# Patient Record
Sex: FEMALE | Race: ASIAN | NOT HISPANIC OR LATINO | URBAN - METROPOLITAN AREA
[De-identification: names, ages, dates, MRNs, and addresses within clinical notes are randomized per-mention and may not be internally consistent; named-entity substitution may affect disease eponyms.]

---

## 2019-05-06 VITALS
RESPIRATION RATE: 16 BRPM | OXYGEN SATURATION: 100 % | WEIGHT: 130.07 LBS | TEMPERATURE: 98 F | HEART RATE: 58 BPM | SYSTOLIC BLOOD PRESSURE: 134 MMHG | HEIGHT: 60 IN | DIASTOLIC BLOOD PRESSURE: 79 MMHG

## 2019-05-06 NOTE — H&P ADULT - NSICDXPASTMEDICALHX_GEN_ALL_CORE_FT
PAST MEDICAL HISTORY:  CAD (coronary artery disease)     Diabetes     History of hypertension     Hypercholesterolemia

## 2019-05-06 NOTE — H&P ADULT - HISTORY OF PRESENT ILLNESS
57yo F with PMHx of HTN, HLD, DM, PAD and known CAD who presented to [facility] on 3/12/19 c/o CP x 1 week. The pain is exertional, occurring with walking and worst when climbing stairs. Pt denies ..... palpitations, diaphoresis, dizziness, syncope, SOB, MERCADO, orthopnea, PND, LE edema, fatigue, N/V, melena, hematochezia, fever or chills. During her admission, she had a CTA coronaries (3/12/19) revealing Calcium score 1687 (99th%), LAD diffuse extensive calcified plaque in mid-distal 50-75%, LCx diffuse extensive calcified plaque in prox, mid, distal 50-75%, RCA diffuse extensive calcified plaque in mid-distal 50-75% 57yo F with PMHx of HTN, HLD, DM and known CAD/NSTEMI s/p PCI @ [facility] 3/13/19 (PTCA/ENDER OM2, PTCA OM1) who presented to [facility] on 3/12/19 c/o CP x 1 week. The pain is exertional, occurring with walking and worst when climbing stairs. Pt denies ..... palpitations, diaphoresis, dizziness, syncope, SOB, MERCADO, orthopnea, PND, LE edema, fatigue, N/V, melena, hematochezia, fever or chills. During her admission she ruled in NSTEMI and had a CTA coronaries (3/12/19) revealing Calcium score 1687 (99th%), LAD diffuse extensive calcified plaque in mid-distal 50-75%, LCx diffuse extensive calcified plaque in prox, mid, distal 50-75%, RCA diffuse extensive calcified plaque in mid-distal 50-75%. She subsequently had a Cardiac Cath (3/13/19) receiving PTCA OM1 (95%), PTCA/ENDER OM2 (80%), LM luminal, pLAD 70%, mLAD 75%, pLCx 40%, mRCA 40%, dRCA 80%, RPDA 75%, EF 60% 59yo F with PMHx of HTN, HLD, DM and known CAD/NSTEMI s/p PCI @ [facility] 3/13/19 (PTCA/ENDER OM2, PTCA OM1) who originally presented to [facility] on 3/12/19 c/o exertional CP x 1 week when walking or climbing stairs. Since her last procedure, she endorses "mild palpitations" and MERCADO when going up 3 flight of stairs to get to her apt, which resolves with rest. Pt denies CP, diaphoresis, dizziness, syncope, SOB at rest, orthopnea, PND, LE edema, fatigue, N/V, melena, hematochezia, fever or chills. During her admission she ruled in NSTEMI and had a CTA coronaries (3/12/19) revealing Calcium score 1687 (99th%), LAD diffuse extensive calcified plaque in mid-distal 50-75%, LCx diffuse extensive calcified plaque in prox, mid, distal 50-75%, RCA diffuse extensive calcified plaque in mid-distal 50-75%. She subsequently had a Cardiac Cath (3/13/19) receiving PTCA OM1 (95%), PTCA/ENDER OM2 (80%), LM luminal, pLAD 70%, mLAD 75%, pLCx 40%, mRCA 40%, dRCA 80%, RPDA 75%, EF 60%.    In light of pts risk factors, CCS class III anginal symptoms and known residual disease, pt now referred to [facility] for staged PCI with CSI.

## 2019-05-06 NOTE — H&P ADULT - ASSESSMENT
57yo F with PMHx of HTN, HLD, DM and known CAD/NSTEMI s/p PCI @ [facility] 3/13/19 (PTCA/ENDER OM2, PTCA OM1) who originally presented to [facility] on 3/12/19 c/o exertional CP x 1 week when walking or climbing stairs.  In light of pts risk factors, CCS class III anginal symptoms and known residual disease, pt now referred to [facility] for staged PCI with CSI.    -H/H stable, no signs of active bleeding. Compliant with aspirin 81 mg daily and brilinta 90 mg BID???  -EF 60%, euvolemic on exam?, pre cath fluids with NS @     Risks & benefits of procedure and alternative therapy have been explained to the patient including but not limited to: allergic reaction, bleeding w/possible need for blood transfusion, infection, renal and vascular compromise, limb damage, arrhythmia, stroke, vessel dissection/perforation, Myocardial infarction, emergent CABG. Informed consent obtained and in chart. 57yo F with PMHx of HTN, HLD, DM and known CAD/NSTEMI s/p PCI @ [facility] 3/13/19 (PTCA/ENDER OM2, PTCA OM1) who originally presented to [facility] on 3/12/19 c/o exertional CP x 1 week when walking or climbing stairs.  In light of pts risk factors, CCS class III anginal symptoms and known residual disease, pt now referred to [facility] for staged PCI with CSI.    -H/H stable, no signs of active bleeding. Compliant with aspirin 81 mg daily and brilinta 90 mg BID and took home doses this am. No additional given pre-cath   -EF 60%, euvolemic on exam, pre cath fluids with NS @ 75 cc/hr     Risks & benefits of procedure and alternative therapy have been explained to the patient including but not limited to: allergic reaction, bleeding w/possible need for blood transfusion, infection, renal and vascular compromise, limb damage, arrhythmia, stroke, vessel dissection/perforation, Myocardial infarction, emergent CABG. Informed consent obtained and in chart.

## 2019-05-07 ENCOUNTER — INPATIENT (INPATIENT)
Facility: HOSPITAL | Age: 59
LOS: 0 days | Discharge: HOME | DRG: 247 | End: 2019-05-08
Attending: INTERNAL MEDICINE | Admitting: INTERNAL MEDICINE
Payer: COMMERCIAL

## 2019-05-07 LAB
ALBUMIN SERPL ELPH-MCNC: 4.4 G/DL — SIGNIFICANT CHANGE UP (ref 3.3–5)
ALP SERPL-CCNC: 63 U/L — SIGNIFICANT CHANGE UP (ref 40–120)
ALT FLD-CCNC: 23 U/L — SIGNIFICANT CHANGE UP (ref 10–45)
ANION GAP SERPL CALC-SCNC: 12 MMOL/L — SIGNIFICANT CHANGE UP (ref 5–17)
APTT BLD: 29.6 SEC — SIGNIFICANT CHANGE UP (ref 27.5–36.3)
AST SERPL-CCNC: 23 U/L — SIGNIFICANT CHANGE UP (ref 10–40)
BASOPHILS # BLD AUTO: 0.06 K/UL — SIGNIFICANT CHANGE UP (ref 0–0.2)
BASOPHILS NFR BLD AUTO: 1 % — SIGNIFICANT CHANGE UP (ref 0–2)
BILIRUB SERPL-MCNC: 0.8 MG/DL — SIGNIFICANT CHANGE UP (ref 0.2–1.2)
BUN SERPL-MCNC: 11 MG/DL — SIGNIFICANT CHANGE UP (ref 7–23)
CALCIUM SERPL-MCNC: 10.2 MG/DL — SIGNIFICANT CHANGE UP (ref 8.4–10.5)
CHLORIDE SERPL-SCNC: 105 MMOL/L — SIGNIFICANT CHANGE UP (ref 96–108)
CHOLEST SERPL-MCNC: 123 MG/DL — SIGNIFICANT CHANGE UP (ref 10–199)
CK MB CFR SERPL CALC: 1.1 NG/ML — SIGNIFICANT CHANGE UP (ref 0–6.7)
CK SERPL-CCNC: 93 U/L — SIGNIFICANT CHANGE UP (ref 25–170)
CO2 SERPL-SCNC: 23 MMOL/L — SIGNIFICANT CHANGE UP (ref 22–31)
CREAT SERPL-MCNC: 0.64 MG/DL — SIGNIFICANT CHANGE UP (ref 0.5–1.3)
EOSINOPHIL # BLD AUTO: 0.39 K/UL — SIGNIFICANT CHANGE UP (ref 0–0.5)
EOSINOPHIL NFR BLD AUTO: 6.7 % — HIGH (ref 0–6)
GLUCOSE BLDC GLUCOMTR-MCNC: 161 MG/DL — HIGH (ref 70–99)
GLUCOSE SERPL-MCNC: 176 MG/DL — HIGH (ref 70–99)
HBA1C BLD-MCNC: 8.7 % — HIGH (ref 4–5.6)
HCT VFR BLD CALC: 37.2 % — SIGNIFICANT CHANGE UP (ref 34.5–45)
HDLC SERPL-MCNC: 43 MG/DL — LOW
HGB BLD-MCNC: 12.2 G/DL — SIGNIFICANT CHANGE UP (ref 11.5–15.5)
IMM GRANULOCYTES NFR BLD AUTO: 0.3 % — SIGNIFICANT CHANGE UP (ref 0–1.5)
INR BLD: 0.93 — SIGNIFICANT CHANGE UP (ref 0.88–1.16)
LIPID PNL WITH DIRECT LDL SERPL: 57 MG/DL — SIGNIFICANT CHANGE UP
LYMPHOCYTES # BLD AUTO: 1.62 K/UL — SIGNIFICANT CHANGE UP (ref 1–3.3)
LYMPHOCYTES # BLD AUTO: 27.7 % — SIGNIFICANT CHANGE UP (ref 13–44)
MCHC RBC-ENTMCNC: 28.8 PG — SIGNIFICANT CHANGE UP (ref 27–34)
MCHC RBC-ENTMCNC: 32.8 GM/DL — SIGNIFICANT CHANGE UP (ref 32–36)
MCV RBC AUTO: 87.7 FL — SIGNIFICANT CHANGE UP (ref 80–100)
MONOCYTES # BLD AUTO: 0.36 K/UL — SIGNIFICANT CHANGE UP (ref 0–0.9)
MONOCYTES NFR BLD AUTO: 6.2 % — SIGNIFICANT CHANGE UP (ref 2–14)
NEUTROPHILS # BLD AUTO: 3.4 K/UL — SIGNIFICANT CHANGE UP (ref 1.8–7.4)
NEUTROPHILS NFR BLD AUTO: 58.1 % — SIGNIFICANT CHANGE UP (ref 43–77)
NRBC # BLD: 0 /100 WBCS — SIGNIFICANT CHANGE UP (ref 0–0)
PLATELET # BLD AUTO: 262 K/UL — SIGNIFICANT CHANGE UP (ref 150–400)
POTASSIUM SERPL-MCNC: 5 MMOL/L — SIGNIFICANT CHANGE UP (ref 3.5–5.3)
POTASSIUM SERPL-SCNC: 5 MMOL/L — SIGNIFICANT CHANGE UP (ref 3.5–5.3)
PROT SERPL-MCNC: 8.3 G/DL — SIGNIFICANT CHANGE UP (ref 6–8.3)
PROTHROM AB SERPL-ACNC: 10.5 SEC — SIGNIFICANT CHANGE UP (ref 10–12.9)
RBC # BLD: 4.24 M/UL — SIGNIFICANT CHANGE UP (ref 3.8–5.2)
RBC # FLD: 12.6 % — SIGNIFICANT CHANGE UP (ref 10.3–14.5)
SODIUM SERPL-SCNC: 140 MMOL/L — SIGNIFICANT CHANGE UP (ref 135–145)
TOTAL CHOLESTEROL/HDL RATIO MEASUREMENT: 2.9 RATIO — LOW (ref 3.3–7.1)
TRIGL SERPL-MCNC: 115 MG/DL — SIGNIFICANT CHANGE UP (ref 10–149)
WBC # BLD: 5.85 K/UL — SIGNIFICANT CHANGE UP (ref 3.8–10.5)
WBC # FLD AUTO: 5.85 K/UL — SIGNIFICANT CHANGE UP (ref 3.8–10.5)

## 2019-05-07 PROCEDURE — 93571 IV DOP VEL&/PRESS C FLO 1ST: CPT | Mod: 26

## 2019-05-07 PROCEDURE — 92924 PRQ TRLUML C ATHRC 1 ART&/BR: CPT | Mod: LD

## 2019-05-07 PROCEDURE — 93010 ELECTROCARDIOGRAM REPORT: CPT | Mod: 59

## 2019-05-07 PROCEDURE — 92978 ENDOLUMINL IVUS OCT C 1ST: CPT | Mod: 26

## 2019-05-07 PROCEDURE — 93454 CORONARY ARTERY ANGIO S&I: CPT | Mod: 26,XU

## 2019-05-07 PROCEDURE — 92979 ENDOLUMINL IVUS OCT C EA: CPT | Mod: 26

## 2019-05-07 PROCEDURE — 99221 1ST HOSP IP/OBS SF/LOW 40: CPT

## 2019-05-07 RX ORDER — INSULIN LISPRO 100/ML
VIAL (ML) SUBCUTANEOUS ONCE
Qty: 0 | Refills: 0 | Status: DISCONTINUED | OUTPATIENT
Start: 2019-05-07 | End: 2019-05-07

## 2019-05-07 RX ORDER — SODIUM CHLORIDE 9 MG/ML
1000 INJECTION, SOLUTION INTRAVENOUS
Qty: 0 | Refills: 0 | Status: DISCONTINUED | OUTPATIENT
Start: 2019-05-07 | End: 2019-05-07

## 2019-05-07 RX ORDER — CARVEDILOL PHOSPHATE 80 MG/1
3.12 CAPSULE, EXTENDED RELEASE ORAL EVERY 12 HOURS
Qty: 0 | Refills: 0 | Status: DISCONTINUED | OUTPATIENT
Start: 2019-05-07 | End: 2019-05-08

## 2019-05-07 RX ORDER — DEXTROSE 50 % IN WATER 50 %
25 SYRINGE (ML) INTRAVENOUS ONCE
Qty: 0 | Refills: 0 | Status: DISCONTINUED | OUTPATIENT
Start: 2019-05-07 | End: 2019-05-07

## 2019-05-07 RX ORDER — LOSARTAN POTASSIUM 100 MG/1
25 TABLET, FILM COATED ORAL DAILY
Qty: 0 | Refills: 0 | Status: DISCONTINUED | OUTPATIENT
Start: 2019-05-08 | End: 2019-05-08

## 2019-05-07 RX ORDER — ACETAMINOPHEN 500 MG
650 TABLET ORAL ONCE
Qty: 0 | Refills: 0 | Status: COMPLETED | OUTPATIENT
Start: 2019-05-07 | End: 2019-05-07

## 2019-05-07 RX ORDER — ATORVASTATIN CALCIUM 80 MG/1
40 TABLET, FILM COATED ORAL AT BEDTIME
Qty: 0 | Refills: 0 | Status: DISCONTINUED | OUTPATIENT
Start: 2019-05-07 | End: 2019-05-08

## 2019-05-07 RX ORDER — CHLORHEXIDINE GLUCONATE 213 G/1000ML
1 SOLUTION TOPICAL ONCE
Qty: 0 | Refills: 0 | Status: DISCONTINUED | OUTPATIENT
Start: 2019-05-07 | End: 2019-05-07

## 2019-05-07 RX ORDER — SODIUM CHLORIDE 9 MG/ML
500 INJECTION INTRAMUSCULAR; INTRAVENOUS; SUBCUTANEOUS
Qty: 0 | Refills: 0 | Status: DISCONTINUED | OUTPATIENT
Start: 2019-05-07 | End: 2019-05-07

## 2019-05-07 RX ORDER — DEXTROSE 50 % IN WATER 50 %
12.5 SYRINGE (ML) INTRAVENOUS ONCE
Qty: 0 | Refills: 0 | Status: DISCONTINUED | OUTPATIENT
Start: 2019-05-07 | End: 2019-05-07

## 2019-05-07 RX ORDER — TICAGRELOR 90 MG/1
90 TABLET ORAL
Qty: 0 | Refills: 0 | Status: DISCONTINUED | OUTPATIENT
Start: 2019-05-07 | End: 2019-05-08

## 2019-05-07 RX ORDER — SIMETHICONE 80 MG/1
80 TABLET, CHEWABLE ORAL ONCE
Qty: 0 | Refills: 0 | Status: COMPLETED | OUTPATIENT
Start: 2019-05-07 | End: 2019-05-07

## 2019-05-07 RX ORDER — ASPIRIN/CALCIUM CARB/MAGNESIUM 324 MG
81 TABLET ORAL DAILY
Qty: 0 | Refills: 0 | Status: DISCONTINUED | OUTPATIENT
Start: 2019-05-07 | End: 2019-05-08

## 2019-05-07 RX ORDER — GLUCAGON INJECTION, SOLUTION 0.5 MG/.1ML
1 INJECTION, SOLUTION SUBCUTANEOUS ONCE
Qty: 0 | Refills: 0 | Status: DISCONTINUED | OUTPATIENT
Start: 2019-05-07 | End: 2019-05-07

## 2019-05-07 RX ORDER — DEXTROSE 50 % IN WATER 50 %
15 SYRINGE (ML) INTRAVENOUS ONCE
Qty: 0 | Refills: 0 | Status: DISCONTINUED | OUTPATIENT
Start: 2019-05-07 | End: 2019-05-07

## 2019-05-07 RX ADMIN — Medication 650 MILLIGRAM(S): at 18:26

## 2019-05-07 RX ADMIN — SIMETHICONE 80 MILLIGRAM(S): 80 TABLET, CHEWABLE ORAL at 23:30

## 2019-05-07 RX ADMIN — CARVEDILOL PHOSPHATE 3.12 MILLIGRAM(S): 80 CAPSULE, EXTENDED RELEASE ORAL at 21:45

## 2019-05-07 RX ADMIN — TICAGRELOR 90 MILLIGRAM(S): 90 TABLET ORAL at 21:45

## 2019-05-07 RX ADMIN — ATORVASTATIN CALCIUM 40 MILLIGRAM(S): 80 TABLET, FILM COATED ORAL at 21:45

## 2019-05-07 NOTE — CONSULT NOTE ADULT - SUBJECTIVE AND OBJECTIVE BOX
Preventive Cardiology Consultation Note    Cardiologist - Dr. Snow     CHIEF COMPLAINT: s/p cardiac catheterization requiring cardiovascular prevention optimization and education    HISTORY OF PRESENT ILLNESS: 59yo F with PMHx of HTN, HLD, DM and known CAD/NSTEMI s/p PCI @ [facility] 3/13/19 (PTCA/ENDER OM2, PTCA OM1). During her admission she ruled in NSTEMI and had a CTA coronaries (3/12/19) revealing Calcium score 1687 (99th%), LAD diffuse extensive calcified plaque in mid-distal 50-75%, LCx diffuse extensive calcified plaque in prox, mid, distal 50-75%, RCA diffuse extensive calcified plaque in mid-distal 50-75%. She subsequently had a Cardiac Cath (3/13/19) receiving PTCA OM1 (95%), PTCA/ENDER OM2 (80%), LM luminal, pLAD 70%, mLAD 75%, pLCx 40%, mRCA 40%, dRCA 80%, RPDA 75%, EF 60%. Patient is now s/p staged PCI on 5/7/19: ENDER x 2 p/m LAD. Patent OM1 stent. mRCA 50%. EF 60%.     Review of systems otherwise negative.     Lifestyle History:  Mediterranean Diet Score (9 question survey) was 5.   (8-9: optimal, 6-7: near-optimal, 4-5: suboptimal, 0-3: markedly suboptimal)  Exercise: Patient denies any regular exercise other than walking necessary for daily activities.   Smoking: Patient denies any history of smoking.   Stress: Patient denies any stress.     PAST MEDICAL & SURGICAL HISTORY:  CAD (coronary artery disease)  Hypercholesterolemia  History of hypertension  Diabetes  No significant past surgical history    FAMILY HISTORY:   Patient denies any first degree family history of premature CAD or CVA.     Allergies:   clotrimazole (Rash)  Honey (Other)      HOME MEDICATIONS:  · 	Aspirin Enteric Coated 81 mg oral delayed release tablet: 1 tab(s) orally once a day, Last Dose Taken:    · 	Brilinta (ticagrelor) 90 mg oral tablet: 1 tab(s) orally 2 times a day, Last Dose Taken:    · 	atorvastatin 40 mg oral tablet: 1 tab(s) orally once a day, Last Dose Taken:    · 	losartan 25 mg oral tablet: 1 tab(s) orally once a day  · 	carvedilol 3.125 mg oral tablet: 1 tab(s) orally 2 times a day, Last Dose Taken:      PHYSICAL EXAM:  · Temp (F)	98.3 Degrees F	  · Temp (C)	36.8 Degrees C	  · Heart Rate	58 /min	  · Respiration Rate (breaths/min)	16 /min	  · BP Systolic	134 mm Hg	  · BP Diastolic	79 mm Hg	  · Blood Pressure - Method	electronic	  · BP Noninvasive Mean	97 mm Hg	  · SpO2 (%)	100 %	  · O2 delivery	room air 	    Height (cm): 152.4 (05-07 @ 11:27)  Weight (kg): 59 (05-07 @ 11:27)  BMI (kg/m2): 25.4 (05-07 @ 11:27)  	  Gen- awake, conversive  Head-NCAT; eyes: no corneal arcus noted b/l; no xanthelasmas   Neck- no thyromegaly, no cervical LAD, no JVD, no carotid bruit b/l  Respiratory- good air entry b/l, no WRR  Cardiovascular- S1S2, RRR, no MRG appr, no LE edema b/l, distal pulses 1+ b/l  Gastrointestinal- no HSM, no masses  Neurology- moves all extremities, no focal deficits  Psych- normal affect, non-depressed mood  Skin- no lesions, no rashes, no xanthomas     LABS:	                        12.2   5.85  )-----------( 262      ( 07 May 2019 11:07 )             37.2     05-07    140  |  105  |  11  ----------------------------<  176<H>  5.0   |  23  |  0.64    Ca    10.2      07 May 2019 11:07    TPro  8.3  /  Alb  4.4  /  TBili  0.8  /  DBili  x   /  AST  23  /  ALT  23  /  AlkPhos  63  05-07      Hemoglobin A1C, Whole Blood: 8.7 % (05-07 @ 11:07)      Cholesterol, Serum: 123 mg/dL (05-07 @ 11:07)  HDL Cholesterol, Serum: 43 mg/dL (05-07 @ 11:07)  Triglycerides, Serum: 115 mg/dL (05-07 @ 11:07)  Direct LDL: 57 mg/dL (05-07 @ 11:07)      ASSESSMENT/RECOMMENDATIONS: 	  Patient's dietary, exercise and overall lifestyle habits were reviewed. The concept of atherosclerosis and its systemic nature was discussed with a focus on the need to get all cardiovascular risk factors to goal. At this time, I would like to make the following recommendations to optimize atherosclerotic risk factors.     RECOMMENDATIONS:   Anti-platelet Therapy: DAPT per interventionalist recommendation.   Lipid Therapy: Patient is currently taking atorvastatin 40mg daily and is compliant and tolerating it well. We believe this is an appropriate medication for her at this time. Her current lipid profile is at goal, and lifestyle modifications will likely benefit her further. If necessary in the future, it would be reasonable to either increase the statin dosage and/or add Zetia 10mg daily to further optimize her medication regimen and keep her LDL level <70.   Hypertension: Blood pressures during this stay were well-controlled.   Mediterranean Diet Score is 5. Some suggestions include continue incorporating 2 or more servings per day of vegetables, fruits, and whole grains. Increase intake of fish and legumes/beans to 2 or more servings per week. Aim to increase intake of healthy fats, such as olive oil and avocados, and have a handful of nuts/seeds most days. Reduce red/processed meat consumption to 2 or fewer times per week.   Exercise: Recommended gradually increasing activity to 30-45 minutes most days of the week once cleared by referring cardiologist. Cardiac rehab might benefit this patient and is covered by major insurance plans (other than co-pays), please refer.   Medication Adherence: Patient has no issues with adherence at this time.   Smoking: This patient is a non-smoker.   Obesity/Overweight: The patient was advised about specific mechanisms such as reduced portions and increased activity for efforts toward weight loss.   Glucometabolic State: Patient's blood sugar is not well-controlled on the current regimen at this time. Recent HbA1c was 8.7%. Depending on discussions with patient's PCP and/or endocrinologist, we would recommend the possibility of a GLP-1 agonist or SGLT-2 inhibitor, as these newer agents have cardiovascular benefits as well as glucose control.   Sleep Apnea: The patient is at low risk for sleep apnea.   Psychological Stress: The patient appears to be coping with stressors well at this time.     Thank you for the opportunity to see this patient. Please feel free to contact Prevention if there are any questions, or if you feel that your patient would benefit from continued follow-up visits with the Program.    I am acting as a scribe on behalf of Dr. Christina Freedman,    Sanjana Wills, [facility]  Cardiovascular Prevention     Christina Freedman MD  System Director, Cardiovascular Prevention

## 2019-05-08 VITALS
HEART RATE: 64 BPM | SYSTOLIC BLOOD PRESSURE: 122 MMHG | OXYGEN SATURATION: 97 % | DIASTOLIC BLOOD PRESSURE: 58 MMHG | RESPIRATION RATE: 16 BRPM

## 2019-05-08 LAB
ALBUMIN SERPL ELPH-MCNC: 4.2 G/DL — SIGNIFICANT CHANGE UP (ref 3.3–5)
ALP SERPL-CCNC: 55 U/L — SIGNIFICANT CHANGE UP (ref 40–120)
ALT FLD-CCNC: 22 U/L — SIGNIFICANT CHANGE UP (ref 10–45)
ANION GAP SERPL CALC-SCNC: 12 MMOL/L — SIGNIFICANT CHANGE UP (ref 5–17)
AST SERPL-CCNC: 18 U/L — SIGNIFICANT CHANGE UP (ref 10–40)
BILIRUB SERPL-MCNC: 1.2 MG/DL — SIGNIFICANT CHANGE UP (ref 0.2–1.2)
BUN SERPL-MCNC: 13 MG/DL — SIGNIFICANT CHANGE UP (ref 7–23)
CALCIUM SERPL-MCNC: 9.9 MG/DL — SIGNIFICANT CHANGE UP (ref 8.4–10.5)
CHLORIDE SERPL-SCNC: 102 MMOL/L — SIGNIFICANT CHANGE UP (ref 96–108)
CO2 SERPL-SCNC: 23 MMOL/L — SIGNIFICANT CHANGE UP (ref 22–31)
CREAT SERPL-MCNC: 0.81 MG/DL — SIGNIFICANT CHANGE UP (ref 0.5–1.3)
GLUCOSE SERPL-MCNC: 213 MG/DL — HIGH (ref 70–99)
HCT VFR BLD CALC: 37.2 % — SIGNIFICANT CHANGE UP (ref 34.5–45)
HGB BLD-MCNC: 12.2 G/DL — SIGNIFICANT CHANGE UP (ref 11.5–15.5)
MAGNESIUM SERPL-MCNC: 1.6 MG/DL — SIGNIFICANT CHANGE UP (ref 1.6–2.6)
MCHC RBC-ENTMCNC: 28.8 PG — SIGNIFICANT CHANGE UP (ref 27–34)
MCHC RBC-ENTMCNC: 32.8 GM/DL — SIGNIFICANT CHANGE UP (ref 32–36)
MCV RBC AUTO: 87.7 FL — SIGNIFICANT CHANGE UP (ref 80–100)
NRBC # BLD: 0 /100 WBCS — SIGNIFICANT CHANGE UP (ref 0–0)
PLATELET # BLD AUTO: 228 K/UL — SIGNIFICANT CHANGE UP (ref 150–400)
POTASSIUM SERPL-MCNC: 4.7 MMOL/L — SIGNIFICANT CHANGE UP (ref 3.5–5.3)
POTASSIUM SERPL-SCNC: 4.7 MMOL/L — SIGNIFICANT CHANGE UP (ref 3.5–5.3)
PROT SERPL-MCNC: 7.9 G/DL — SIGNIFICANT CHANGE UP (ref 6–8.3)
RBC # BLD: 4.24 M/UL — SIGNIFICANT CHANGE UP (ref 3.8–5.2)
RBC # FLD: 12.6 % — SIGNIFICANT CHANGE UP (ref 10.3–14.5)
SODIUM SERPL-SCNC: 137 MMOL/L — SIGNIFICANT CHANGE UP (ref 135–145)
WBC # BLD: 6.77 K/UL — SIGNIFICANT CHANGE UP (ref 3.8–10.5)
WBC # FLD AUTO: 6.77 K/UL — SIGNIFICANT CHANGE UP (ref 3.8–10.5)

## 2019-05-08 PROCEDURE — 99239 HOSP IP/OBS DSCHRG MGMT >30: CPT

## 2019-05-08 PROCEDURE — 93010 ELECTROCARDIOGRAM REPORT: CPT

## 2019-05-08 RX ORDER — INSULIN LISPRO 100/ML
VIAL (ML) SUBCUTANEOUS
Qty: 0 | Refills: 0 | Status: DISCONTINUED | OUTPATIENT
Start: 2019-05-08 | End: 2019-05-08

## 2019-05-08 RX ORDER — DEXTROSE 50 % IN WATER 50 %
15 SYRINGE (ML) INTRAVENOUS ONCE
Qty: 0 | Refills: 0 | Status: DISCONTINUED | OUTPATIENT
Start: 2019-05-08 | End: 2019-05-08

## 2019-05-08 RX ORDER — CARVEDILOL PHOSPHATE 80 MG/1
1 CAPSULE, EXTENDED RELEASE ORAL
Qty: 60 | Refills: 3
Start: 2019-05-08 | End: 2019-09-04

## 2019-05-08 RX ORDER — ATORVASTATIN CALCIUM 80 MG/1
1 TABLET, FILM COATED ORAL
Qty: 0 | Refills: 0 | COMMUNITY

## 2019-05-08 RX ORDER — ASPIRIN/CALCIUM CARB/MAGNESIUM 324 MG
1 TABLET ORAL
Qty: 0 | Refills: 0 | COMMUNITY

## 2019-05-08 RX ORDER — ASPIRIN/CALCIUM CARB/MAGNESIUM 324 MG
1 TABLET ORAL
Qty: 30 | Refills: 11
Start: 2019-05-08 | End: 2020-05-01

## 2019-05-08 RX ORDER — MAGNESIUM OXIDE 400 MG ORAL TABLET 241.3 MG
400 TABLET ORAL ONCE
Qty: 0 | Refills: 0 | Status: DISCONTINUED | OUTPATIENT
Start: 2019-05-08 | End: 2019-05-08

## 2019-05-08 RX ORDER — CARVEDILOL PHOSPHATE 80 MG/1
1 CAPSULE, EXTENDED RELEASE ORAL
Qty: 60 | Refills: 3 | OUTPATIENT
Start: 2019-05-08 | End: 2019-09-04

## 2019-05-08 RX ORDER — METFORMIN HYDROCHLORIDE 850 MG/1
1 TABLET ORAL
Qty: 30 | Refills: 3
Start: 2019-05-08 | End: 2019-09-04

## 2019-05-08 RX ORDER — TICAGRELOR 90 MG/1
1 TABLET ORAL
Qty: 60 | Refills: 11 | OUTPATIENT
Start: 2019-05-08 | End: 2020-05-01

## 2019-05-08 RX ORDER — ATORVASTATIN CALCIUM 80 MG/1
1 TABLET, FILM COATED ORAL
Qty: 30 | Refills: 3 | OUTPATIENT
Start: 2019-05-08 | End: 2019-09-04

## 2019-05-08 RX ORDER — CARVEDILOL PHOSPHATE 80 MG/1
1 CAPSULE, EXTENDED RELEASE ORAL
Qty: 0 | Refills: 0 | COMMUNITY

## 2019-05-08 RX ORDER — ASPIRIN/CALCIUM CARB/MAGNESIUM 324 MG
1 TABLET ORAL
Qty: 30 | Refills: 11 | OUTPATIENT
Start: 2019-05-08 | End: 2020-05-01

## 2019-05-08 RX ORDER — DEXTROSE 50 % IN WATER 50 %
25 SYRINGE (ML) INTRAVENOUS ONCE
Qty: 0 | Refills: 0 | Status: DISCONTINUED | OUTPATIENT
Start: 2019-05-08 | End: 2019-05-08

## 2019-05-08 RX ORDER — ATORVASTATIN CALCIUM 80 MG/1
1 TABLET, FILM COATED ORAL
Qty: 30 | Refills: 3
Start: 2019-05-08 | End: 2019-09-04

## 2019-05-08 RX ORDER — LOSARTAN POTASSIUM 100 MG/1
1 TABLET, FILM COATED ORAL
Qty: 30 | Refills: 3 | OUTPATIENT
Start: 2019-05-08 | End: 2019-09-04

## 2019-05-08 RX ORDER — LOSARTAN POTASSIUM 100 MG/1
1 TABLET, FILM COATED ORAL
Qty: 30 | Refills: 3
Start: 2019-05-08 | End: 2019-09-04

## 2019-05-08 RX ORDER — GLUCAGON INJECTION, SOLUTION 0.5 MG/.1ML
1 INJECTION, SOLUTION SUBCUTANEOUS ONCE
Qty: 0 | Refills: 0 | Status: DISCONTINUED | OUTPATIENT
Start: 2019-05-08 | End: 2019-05-08

## 2019-05-08 RX ORDER — DEXTROSE 50 % IN WATER 50 %
12.5 SYRINGE (ML) INTRAVENOUS ONCE
Qty: 0 | Refills: 0 | Status: DISCONTINUED | OUTPATIENT
Start: 2019-05-08 | End: 2019-05-08

## 2019-05-08 RX ORDER — TICAGRELOR 90 MG/1
1 TABLET ORAL
Qty: 0 | Refills: 0 | COMMUNITY

## 2019-05-08 RX ORDER — TICAGRELOR 90 MG/1
1 TABLET ORAL
Qty: 60 | Refills: 11
Start: 2019-05-08 | End: 2020-05-01

## 2019-05-08 RX ORDER — LOSARTAN POTASSIUM 100 MG/1
1 TABLET, FILM COATED ORAL
Qty: 0 | Refills: 0 | COMMUNITY

## 2019-05-08 RX ORDER — SODIUM CHLORIDE 9 MG/ML
1000 INJECTION, SOLUTION INTRAVENOUS
Qty: 0 | Refills: 0 | Status: DISCONTINUED | OUTPATIENT
Start: 2019-05-08 | End: 2019-05-08

## 2019-05-08 RX ADMIN — Medication 1: at 07:24

## 2019-05-08 RX ADMIN — CARVEDILOL PHOSPHATE 3.12 MILLIGRAM(S): 80 CAPSULE, EXTENDED RELEASE ORAL at 05:25

## 2019-05-08 RX ADMIN — Medication 81 MILLIGRAM(S): at 09:10

## 2019-05-08 RX ADMIN — Medication 2: at 11:37

## 2019-05-08 RX ADMIN — TICAGRELOR 90 MILLIGRAM(S): 90 TABLET ORAL at 05:25

## 2019-05-08 NOTE — DISCHARGE NOTE NURSING/CASE MANAGEMENT/SOCIAL WORK - NSDCDPATPORTLINK_GEN_ALL_CORE
You can access the [employer] Patient Portal, offered by [facility], by registering with the following website: [email or web address]

## 2019-05-08 NOTE — DISCHARGE NOTE PROVIDER - CARE PROVIDER_API CALL
Colby Snow (DO)  Cardiovascular Disease; Interventional Cardiology  [address], [address] [place] [address], [place] [address]  Phone: [phone]  Fax: [phone]  Follow Up Time:

## 2019-05-08 NOTE — DISCHARGE NOTE PROVIDER - NSDCCPCAREPLAN_GEN_ALL_CORE_FT
PRINCIPAL DISCHARGE DIAGNOSIS  Diagnosis: CAD (coronary artery disease)  Assessment and Plan of Treatment: You have a diagnosis of coronary artery disease and have been started on daily aspirin and plavix. You have a diagnosis of coronary artery disease and received a stent to your coronary artery. You should continue daily aspirin and Brilinta to ensure your stent does not close. DO NOT STOP THESE MEDICATIONS FOR ANY REASON UNLESS OTHERWISE INDICATED BY YOUR CARDIOLOGIST BECAUSE THIS WILL PUT YOU AT RISK FOR A HEART ATTACK. You should refrain from strenuous activity and heavy lifting for 1 week. Please make a follow up appointment with your cardiologist within 1-2 weeks of your discharge. All of your prescriptions have been sent electronically to your pharmacy.        SECONDARY DISCHARGE DIAGNOSES  Diagnosis: DM (diabetes mellitus)  Assessment and Plan of Treatment: You have a diagnosis of diabetes and should continue all of your diabetic medications as prescribed. Please do not take your metformin for 2 days to prevent interaction with the contrast you recieved.      Diagnosis: HLD (hyperlipidemia)  Assessment and Plan of Treatment: Please continue your daily statin to ensure your cardiac stent remains open.      Diagnosis: HTN (hypertension)  Assessment and Plan of Treatment: You have a history of elevated blood pressure and you should continue your blood pressure medications as prescribed.

## 2019-05-08 NOTE — DISCHARGE NOTE PROVIDER - HOSPITAL COURSE
57yo F with PMHx of HTN, HLD, DM and known CAD/NSTEMI s/p PCI @ [facility] 3/13/19 (PTCA/ENDER OM2, PTCA OM1) who originally presented to [facility] on 3/12/19 c/o exertional CP x 1 week when walking or climbing stairs. Since her last procedure, she endorses "mild palpitations" and MERCADO when going up 3 flight of stairs to get to her apt, which resolves with rest. Pt denies CP, diaphoresis, dizziness, syncope, SOB at rest, orthopnea, PND, LE edema, fatigue, N/V, melena, hematochezia, fever or chills. During her admission she ruled in NSTEMI and had a CTA coronaries (3/12/19) revealing Calcium score 1687 (99th%), LAD diffuse extensive calcified plaque in mid-distal 50-75%, LCx diffuse extensive calcified plaque in prox, mid, distal 50-75%, RCA diffuse extensive calcified plaque in mid-distal 50-75%. She subsequently had a Cardiac Cath (3/13/19) receiving PTCA OM1 (95%), PTCA/ENDER OM2 (80%), LM luminal, pLAD 70%, mLAD 75%, pLCx 40%, mRCA 40%, dRCA 80%, RPDA 75%, EF 60%.        In light of pts risk factors, CCS class III anginal symptoms and known residual disease, pt now referred to [facility] for staged PCI with CSI.            s/p staged PCI on 5/7/19: ENDER x 2 p/m LAD. Patent OM1 stent. mRCA 50%. EF 60%. Right groin PC. Perclose ooze at 6pm, pressure applied and epi/lido. Keep on bedrest until 8pm 57yo F with PMHx of HTN, HLD, DM and known CAD/NSTEMI s/p PCI @ [facility] 3/13/19 (PTCA/ENDER OM2, PTCA OM1) who originally presented to [facility] on 3/12/19 c/o exertional CP x 1 week when walking or climbing stairs. Since her last procedure, she endorses "mild palpitations" and MERCADO when going up 3 flight of stairs to get to her apt, which resolves with rest. Pt denies CP, diaphoresis, dizziness, syncope, SOB at rest, orthopnea, PND, LE edema, fatigue, N/V, melena, hematochezia, fever or chills. During her admission she ruled in NSTEMI and had a CTA coronaries (3/12/19) revealing Calcium score 1687 (99th%), LAD diffuse extensive calcified plaque in mid-distal 50-75%, LCx diffuse extensive calcified plaque in prox, mid, distal 50-75%, RCA diffuse extensive calcified plaque in mid-distal 50-75%. She subsequently had a Cardiac Cath (3/13/19) receiving PTCA OM1 (95%), PTCA/ENDER OM2 (80%), LM luminal, pLAD 70%, mLAD 75%, pLCx 40%, mRCA 40%, dRCA 80%, RPDA 75%, EF 60%.    In light of pts risk factors, CCS class III anginal symptoms and known residual disease, pt now referred to [facility] for staged PCI with CSI.  Pt is s/p staged PCI on 5/7/19: ENDER x 2 p/m LAD. Patent OM1 stent. mRCA 50%. EF 60%. Right groin PC. Perclose ooze at 6pm, pressure applied and epi/lido. Keep on bedrest until 8pm. She was examined today and found asymptomatic, VSS, labs WNL.  Right groin site stable.  Pt is to continue ASA 81mg daily and Brilinta 90mg twice daily as well as all other medications as prescribed. Pt is to follow up with outpatient cardiologist as prescribed.

## 2019-05-10 RX ORDER — METFORMIN HYDROCHLORIDE 850 MG/1
1 TABLET ORAL
Qty: 30 | Refills: 3 | OUTPATIENT
Start: 2019-05-10 | End: 2019-09-06

## 2019-05-17 DIAGNOSIS — E11.9 TYPE 2 DIABETES MELLITUS WITHOUT COMPLICATIONS: ICD-10-CM

## 2019-05-17 DIAGNOSIS — Z95.5 PRESENCE OF CORONARY ANGIOPLASTY IMPLANT AND GRAFT: ICD-10-CM

## 2019-05-17 DIAGNOSIS — I25.2 OLD MYOCARDIAL INFARCTION: ICD-10-CM

## 2019-05-17 DIAGNOSIS — I25.84 CORONARY ATHEROSCLEROSIS DUE TO CALCIFIED CORONARY LESION: ICD-10-CM

## 2019-05-17 DIAGNOSIS — Z91.02 FOOD ADDITIVES ALLERGY STATUS: ICD-10-CM

## 2019-05-17 DIAGNOSIS — I10 ESSENTIAL (PRIMARY) HYPERTENSION: ICD-10-CM

## 2019-05-17 DIAGNOSIS — E66.9 OBESITY, UNSPECIFIED: ICD-10-CM

## 2019-05-17 DIAGNOSIS — E78.5 HYPERLIPIDEMIA, UNSPECIFIED: ICD-10-CM

## 2019-05-17 DIAGNOSIS — Z71.3 DIETARY COUNSELING AND SURVEILLANCE: ICD-10-CM

## 2019-05-17 DIAGNOSIS — Z88.3 ALLERGY STATUS TO OTHER ANTI-INFECTIVE AGENTS: ICD-10-CM

## 2019-05-17 DIAGNOSIS — Z79.82 LONG TERM (CURRENT) USE OF ASPIRIN: ICD-10-CM

## 2019-05-17 DIAGNOSIS — I25.110 ATHEROSCLEROTIC HEART DISEASE OF NATIVE CORONARY ARTERY WITH UNSTABLE ANGINA PECTORIS: ICD-10-CM

## 2019-05-23 PROCEDURE — 85730 THROMBOPLASTIN TIME PARTIAL: CPT

## 2019-05-23 PROCEDURE — 80053 COMPREHEN METABOLIC PANEL: CPT

## 2019-05-23 PROCEDURE — 82553 CREATINE MB FRACTION: CPT

## 2019-05-23 PROCEDURE — C1753: CPT

## 2019-05-23 PROCEDURE — 85610 PROTHROMBIN TIME: CPT

## 2019-05-23 PROCEDURE — C1724: CPT

## 2019-05-23 PROCEDURE — 82962 GLUCOSE BLOOD TEST: CPT

## 2019-05-23 PROCEDURE — C1769: CPT

## 2019-05-23 PROCEDURE — C1760: CPT

## 2019-05-23 PROCEDURE — C1725: CPT

## 2019-05-23 PROCEDURE — 83735 ASSAY OF MAGNESIUM: CPT

## 2019-05-23 PROCEDURE — 80061 LIPID PANEL: CPT

## 2019-05-23 PROCEDURE — 83036 HEMOGLOBIN GLYCOSYLATED A1C: CPT

## 2019-05-23 PROCEDURE — 85025 COMPLETE CBC W/AUTO DIFF WBC: CPT

## 2019-05-23 PROCEDURE — C1887: CPT

## 2019-05-23 PROCEDURE — C1894: CPT

## 2019-05-23 PROCEDURE — C1889: CPT

## 2019-05-23 PROCEDURE — 85027 COMPLETE CBC AUTOMATED: CPT

## 2019-05-23 PROCEDURE — 82550 ASSAY OF CK (CPK): CPT

## 2019-05-23 PROCEDURE — 93005 ELECTROCARDIOGRAM TRACING: CPT

## 2019-05-23 PROCEDURE — 36415 COLL VENOUS BLD VENIPUNCTURE: CPT

## 2019-05-23 PROCEDURE — C1874: CPT

## 2021-11-12 VITALS
SYSTOLIC BLOOD PRESSURE: 155 MMHG | OXYGEN SATURATION: 97 % | TEMPERATURE: 99 F | HEART RATE: 69 BPM | DIASTOLIC BLOOD PRESSURE: 71 MMHG | RESPIRATION RATE: 18 BRPM

## 2021-11-12 PROBLEM — I25.10 ATHEROSCLEROTIC HEART DISEASE OF NATIVE CORONARY ARTERY WITHOUT ANGINA PECTORIS: Chronic | Status: ACTIVE | Noted: 2019-05-06

## 2021-11-12 RX ORDER — CHLORHEXIDINE GLUCONATE 213 G/1000ML
1 SOLUTION TOPICAL ONCE
Refills: 0 | Status: DISCONTINUED | OUTPATIENT
Start: 2021-11-16 | End: 2021-11-30

## 2021-11-12 NOTE — H&P ADULT - HISTORY OF PRESENT ILLNESS
COVID:  Pharmacy:  Escort:    61Y F w/ PMHx HTN, HLD, CAD w/ prior NSTEMI 3/13/19, s/p multiple PCIs (most recently 5/7/19 ENDER x2 prox/mid LAD and patent OM1 stent), and DM-II c/b retinopathy, initially presented to outpt cardiologist Dr. Snow c/o CP x __. She denies any ___ palpitations, dizziness, syncope, diaphoresis, fatigue, LE edema, SOB, MERCADO, orthopnea, PND, N/V/D, abd pain, cough, congestion, fever, chills or recent sick contact. Pt underwent suboptimal NST 10/22/21 only achieving 72.61% predicted max HR and terminated 2/2 CP and dyspnea, stress EKG revealed 0.5mm horizontal ST depression (II, III and aVF), resolving 5 min in recovery, and rare PVCs and APCs.  Most recent Cardiac Cath 5/7/19 @ [facility]: IVUS guided atherectomy/ENDER x 2 to prox/mid LAD; LM normal, pLCx stent patent, OM1 stent patent (iFR 0.94), mRCA 50%.  In light of pts risk factors, CCS class __ anginal symptoms and abnormal ____, pt now presents to [facility] for recommended cardiac catheterization with possible intervention if clinically indicated.  COVID:  Pharmacy:  Escort:    **[facility]***    61Y F w/ PMHx HTN, HLD, CAD w/ prior NSTEMI 3/13/19, s/p multiple PCIs (most recently 5/7/19 ENDER x2 prox/mid LAD and patent OM1 stent), and DM-II c/b retinopathy, initially presented to outpt cardiologist Dr. Snow c/o CP x __. She denies any ___ palpitations, dizziness, syncope, diaphoresis, fatigue, LE edema, SOB, MERCADO, orthopnea, PND, N/V/D, abd pain, cough, congestion, fever, chills or recent sick contact. Pt underwent suboptimal NST 10/22/21 only achieving 72.61% predicted max HR and terminated 2/2 CP and dyspnea, stress EKG revealed 0.5mm horizontal ST depression (II, III and aVF), resolving 5 min in recovery, and rare PVCs and APCs.  Most recent Cardiac Cath 5/7/19 @ [facility]: IVUS guided atherectomy/ENDER x 2 to prox/mid LAD; LM normal, pLCx stent patent, OM1 stent patent (iFR 0.94), mRCA 50%.  In light of pts risk factors, CCS class __ anginal symptoms and abnormal ____, pt now presents to [facility] for recommended cardiac catheterization with possible intervention if clinically indicated.  CARDIOLOGIST: Dr. Snow   COVID: _____ 11/13/21 at [facility]  PHARMACY: [facility] (in [place])  ESCORT: daughter    61Y F w/ PMHx HTN, HLD, CAD w/ prior NSTEMI 3/13/19, s/p multiple PCIs (most recently 5/7/19 ENDER x2 prox/mid LAD and patent OM1 stent), and DM-II c/b retinopathy, initially presented to outpt cardiologist Dr. Snow c/o CP that is substernal, non-radiating, pressure-like, and occurs with exertion. She denies any palpitations, dizziness, syncope, diaphoresis, fatigue, LE edema, SOB, MERCADO, orthopnea, PND, N/V/D, abd pain, cough, congestion, fever, chills or recent sick contact.    NST 10/22/21 only achieving 72.61% predicted max HR and terminated 2/2 CP and dyspnea, stress EKG revealed 0.5mm horizontal ST depression (II, III and aVF), resolving 5 min in recovery, and rare PVCs and APCs.Cardiac Cath 5/7/19 @ [facility]: IVUS guided atherectomy/ENDER x 2 to prox/mid LAD; LM normal, pLCx stent patent, OM1 stent patent (iFR 0.94), mRCA 50%.    In light of pts risk factors, CCS class III anginal symptoms and abnormal/suboptimal NST, pt now presents to [facility] for recommended cardiac catheterization with possible intervention if clinically indicated.  CARDIOLOGIST: Dr. Edy LEWIS: 11/13/21 at [facility]  PHARMACY: [facility] (in [place])  ESCORT: daughter    60 y/o Female w/ PMHx HTN, HLD, CAD w/ prior NSTEMI 3/13/19, s/p multiple PCIs (most recently 5/7/19 ENDER x2 prox/mid LAD and patent OM1 stent), and DM-II c/b retinopathy, initially presented to outpt cardiologist Dr. Snow c/o CP that is substernal, non-radiating, pressure-like, and occurs with exertion. She denies any palpitations, dizziness, syncope, diaphoresis, fatigue, LE edema, SOB, MERCADO, orthopnea, PND, N/V/D, abd pain, cough, congestion, fever, chills or recent sick contact. NST 10/22/21 only achieving 72.61% predicted max HR and terminated 2/2 CP and dyspnea, stress EKG revealed 0.5mm horizontal ST depression (II, III and aVF), resolving 5 min in recovery, and rare PVCs and APCs. Cardiac Cath 5/7/19 @ [facility]: IVUS guided atherectomy/ENDER x 2 to prox/mid LAD; LM normal, pLCx stent patent, OM1 stent patent (iFR 0.94), mRCA 50%.  In light of pts risk factors, CCS class III anginal symptoms and abnormal/suboptimal NST, pt now presents to [facility] for recommended cardiac catheterization with possible intervention if clinically indicated.

## 2021-11-12 NOTE — H&P ADULT - NSHPLABSRESULTS_GEN_ALL_CORE
12.8   4.95  )-----------( 243      ( 16 Nov 2021 08:22 )             39.6   11-16    139  |  105  |  13  ----------------------------<  199<H>  4.1   |  24  |  0.65    Ca    9.8      16 Nov 2021 08:21    TPro  8.1  /  Alb  4.6  /  TBili  0.6  /  DBili  x   /  AST  17  /  ALT  13  /  AlkPhos  59  11-16  PT/INR - ( 16 Nov 2021 08:22 )   PT: 12.1 sec;   INR: 1.01          PTT - ( 16 Nov 2021 08:22 )  PTT:29.7 sec

## 2021-11-12 NOTE — H&P ADULT - ASSESSMENT
60 y/o Female w/ PMHx HTN, HLD, CAD w/ prior NSTEMI 3/13/19, s/p multiple PCIs (most recently 5/7/19 ENDER x2 prox/mid LAD and patent OM1 stent), and DM-II c/b retinopathy, initially presented to outpt cardiologist Dr. Snow c/o CP that is substernal, non-radiating, pressure-like, and occurs with exertion.In light of pts risk factors, CCS class III anginal symptoms and abnormal/suboptimal NST, pt now presents to [facility] for recommended cardiac catheterization with possible intervention if clinically indicated.     EKG: NSR @60bpm w/o ischemic changes	  ASA 3			Mallampati class: 2        Anginal Class: 3    -clotrimazole (Rash)  Honey (Other)    -H/H = 12.8/39.6. Pt denies BRBPR, hematuria, hematochezia, melena. Pt reports good compliance w/ home ASA 81mg QD and Plavix 75mg QD, stating his last dose of both was yesterday (11/15). Pt loaded w/ ASA 325mg x1 and Plavix 300mg x1  -BUN/Cr = 13/0.65. EF unknown. Euvolemic on exam. IV NS @ 75cc/hr x 4hrs started pre procedure    Sedation Plan:   Moderate  Patient Is Suitable Candidate For Sedation?     Yes    Risks & benefits of procedure and alternative therapy have been explained to the patient including but not limited to: allergic reaction, bleeding with possible need for blood transfusion, infection, renal and vascular compromise, limb damage, arrhythmia, stroke, vessel dissection/perforation, myocardial infarction, and emergent CABG. Informed consent obtained at bedside and included in chart. 62 y/o Female w/ PMHx HTN, HLD, CAD w/ prior NSTEMI 3/13/19, s/p multiple PCIs (most recently 5/7/19 ENDER x2 prox/mid LAD and patent OM1 stent), and DM-II c/b retinopathy, initially presented to outpt cardiologist Dr. Snow c/o CP that is substernal, non-radiating, pressure-like, and occurs with exertion.In light of pts risk factors, CCS class III anginal symptoms and abnormal/suboptimal NST, pt now presents to [facility] for recommended cardiac catheterization with possible intervention if clinically indicated.     EKG: NSR @60bpm w/o ischemic changes	  ASA 3			Mallampati class: 2        Anginal Class: 3    Allergies:  -clotrimazole (Rash)  Honey (Other)    -H/H = 12.8/39.6. Pt denies BRBPR, hematuria, hematochezia, melena. Pt reports good compliance w/ home ASA 81mg QD and Plavix 75mg QD, stating his last dose of both was yesterday (11/15). Pt loaded w/ ASA 325mg x1 and Plavix 300mg x1  -BUN/Cr = 13/0.65. EF unknown. Euvolemic on exam. IV NS @ 75cc/hr x 4hrs started pre procedure    Sedation Plan:   Moderate  Patient Is Suitable Candidate For Sedation?     Yes    Risks & benefits of procedure and alternative therapy have been explained to the patient including but not limited to: allergic reaction, bleeding with possible need for blood transfusion, infection, renal and vascular compromise, limb damage, arrhythmia, stroke, vessel dissection/perforation, myocardial infarction, and emergent CABG. Informed consent obtained at bedside and included in chart.

## 2021-11-16 ENCOUNTER — OUTPATIENT (OUTPATIENT)
Dept: OUTPATIENT SERVICES | Facility: HOSPITAL | Age: 61
LOS: 1 days | Discharge: HOME | End: 2021-11-16
Payer: MEDICAID

## 2021-11-16 LAB
A1C WITH ESTIMATED AVERAGE GLUCOSE RESULT: 8.3 % — HIGH (ref 4–5.6)
ALBUMIN SERPL ELPH-MCNC: 4.6 G/DL — SIGNIFICANT CHANGE UP (ref 3.3–5)
ALP SERPL-CCNC: 59 U/L — SIGNIFICANT CHANGE UP (ref 40–120)
ALT FLD-CCNC: 13 U/L — SIGNIFICANT CHANGE UP (ref 10–45)
ANION GAP SERPL CALC-SCNC: 10 MMOL/L — SIGNIFICANT CHANGE UP (ref 5–17)
APTT BLD: 29.7 SEC — SIGNIFICANT CHANGE UP (ref 27.5–35.5)
AST SERPL-CCNC: 17 U/L — SIGNIFICANT CHANGE UP (ref 10–40)
BASOPHILS # BLD AUTO: 0.04 K/UL — SIGNIFICANT CHANGE UP (ref 0–0.2)
BASOPHILS NFR BLD AUTO: 0.8 % — SIGNIFICANT CHANGE UP (ref 0–2)
BILIRUB SERPL-MCNC: 0.6 MG/DL — SIGNIFICANT CHANGE UP (ref 0.2–1.2)
BUN SERPL-MCNC: 13 MG/DL — SIGNIFICANT CHANGE UP (ref 7–23)
CALCIUM SERPL-MCNC: 9.8 MG/DL — SIGNIFICANT CHANGE UP (ref 8.4–10.5)
CHLORIDE SERPL-SCNC: 105 MMOL/L — SIGNIFICANT CHANGE UP (ref 96–108)
CHOLEST SERPL-MCNC: 158 MG/DL — SIGNIFICANT CHANGE UP
CK MB CFR SERPL CALC: 1.4 NG/ML — SIGNIFICANT CHANGE UP (ref 0–6.7)
CK SERPL-CCNC: 121 U/L — SIGNIFICANT CHANGE UP (ref 25–170)
CO2 SERPL-SCNC: 24 MMOL/L — SIGNIFICANT CHANGE UP (ref 22–31)
CREAT SERPL-MCNC: 0.65 MG/DL — SIGNIFICANT CHANGE UP (ref 0.5–1.3)
EOSINOPHIL # BLD AUTO: 0.14 K/UL — SIGNIFICANT CHANGE UP (ref 0–0.5)
EOSINOPHIL NFR BLD AUTO: 2.8 % — SIGNIFICANT CHANGE UP (ref 0–6)
ESTIMATED AVERAGE GLUCOSE: 192 MG/DL — HIGH (ref 68–114)
GLUCOSE BLDC GLUCOMTR-MCNC: 173 MG/DL — HIGH (ref 70–99)
GLUCOSE BLDC GLUCOMTR-MCNC: 180 MG/DL — HIGH (ref 70–99)
GLUCOSE SERPL-MCNC: 199 MG/DL — HIGH (ref 70–99)
HCT VFR BLD CALC: 39.6 % — SIGNIFICANT CHANGE UP (ref 34.5–45)
HDLC SERPL-MCNC: 54 MG/DL — SIGNIFICANT CHANGE UP
HGB BLD-MCNC: 12.8 G/DL — SIGNIFICANT CHANGE UP (ref 11.5–15.5)
IMM GRANULOCYTES NFR BLD AUTO: 0 % — SIGNIFICANT CHANGE UP (ref 0–1.5)
INR BLD: 1.01 — SIGNIFICANT CHANGE UP (ref 0.88–1.16)
LIPID PNL WITH DIRECT LDL SERPL: 84 MG/DL — SIGNIFICANT CHANGE UP
LYMPHOCYTES # BLD AUTO: 1.68 K/UL — SIGNIFICANT CHANGE UP (ref 1–3.3)
LYMPHOCYTES # BLD AUTO: 33.9 % — SIGNIFICANT CHANGE UP (ref 13–44)
MCHC RBC-ENTMCNC: 27.9 PG — SIGNIFICANT CHANGE UP (ref 27–34)
MCHC RBC-ENTMCNC: 32.3 GM/DL — SIGNIFICANT CHANGE UP (ref 32–36)
MCV RBC AUTO: 86.5 FL — SIGNIFICANT CHANGE UP (ref 80–100)
MONOCYTES # BLD AUTO: 0.36 K/UL — SIGNIFICANT CHANGE UP (ref 0–0.9)
MONOCYTES NFR BLD AUTO: 7.3 % — SIGNIFICANT CHANGE UP (ref 2–14)
NEUTROPHILS # BLD AUTO: 2.73 K/UL — SIGNIFICANT CHANGE UP (ref 1.8–7.4)
NEUTROPHILS NFR BLD AUTO: 55.2 % — SIGNIFICANT CHANGE UP (ref 43–77)
NON HDL CHOLESTEROL: 104 MG/DL — SIGNIFICANT CHANGE UP
NRBC # BLD: 0 /100 WBCS — SIGNIFICANT CHANGE UP (ref 0–0)
PLATELET # BLD AUTO: 243 K/UL — SIGNIFICANT CHANGE UP (ref 150–400)
POTASSIUM SERPL-MCNC: 4.1 MMOL/L — SIGNIFICANT CHANGE UP (ref 3.5–5.3)
POTASSIUM SERPL-SCNC: 4.1 MMOL/L — SIGNIFICANT CHANGE UP (ref 3.5–5.3)
PROT SERPL-MCNC: 8.1 G/DL — SIGNIFICANT CHANGE UP (ref 6–8.3)
PROTHROM AB SERPL-ACNC: 12.1 SEC — SIGNIFICANT CHANGE UP (ref 10.6–13.6)
RBC # BLD: 4.58 M/UL — SIGNIFICANT CHANGE UP (ref 3.8–5.2)
RBC # FLD: 12.4 % — SIGNIFICANT CHANGE UP (ref 10.3–14.5)
SODIUM SERPL-SCNC: 139 MMOL/L — SIGNIFICANT CHANGE UP (ref 135–145)
TRIGL SERPL-MCNC: 99 MG/DL — SIGNIFICANT CHANGE UP
WBC # BLD: 4.95 K/UL — SIGNIFICANT CHANGE UP (ref 3.8–10.5)
WBC # FLD AUTO: 4.95 K/UL — SIGNIFICANT CHANGE UP (ref 3.8–10.5)

## 2021-11-16 PROCEDURE — C1887: CPT

## 2021-11-16 PROCEDURE — 85025 COMPLETE CBC W/AUTO DIFF WBC: CPT

## 2021-11-16 PROCEDURE — 82553 CREATINE MB FRACTION: CPT

## 2021-11-16 PROCEDURE — 93458 L HRT ARTERY/VENTRICLE ANGIO: CPT

## 2021-11-16 PROCEDURE — 85730 THROMBOPLASTIN TIME PARTIAL: CPT

## 2021-11-16 PROCEDURE — 80061 LIPID PANEL: CPT

## 2021-11-16 PROCEDURE — 82962 GLUCOSE BLOOD TEST: CPT

## 2021-11-16 PROCEDURE — 99152 MOD SED SAME PHYS/QHP 5/>YRS: CPT

## 2021-11-16 PROCEDURE — 80053 COMPREHEN METABOLIC PANEL: CPT

## 2021-11-16 PROCEDURE — 93005 ELECTROCARDIOGRAM TRACING: CPT

## 2021-11-16 PROCEDURE — 85610 PROTHROMBIN TIME: CPT

## 2021-11-16 PROCEDURE — C1894: CPT

## 2021-11-16 PROCEDURE — 36415 COLL VENOUS BLD VENIPUNCTURE: CPT

## 2021-11-16 PROCEDURE — 93458 L HRT ARTERY/VENTRICLE ANGIO: CPT | Mod: 26

## 2021-11-16 PROCEDURE — 83036 HEMOGLOBIN GLYCOSYLATED A1C: CPT

## 2021-11-16 PROCEDURE — 82550 ASSAY OF CK (CPK): CPT

## 2021-11-16 PROCEDURE — 93010 ELECTROCARDIOGRAM REPORT: CPT

## 2021-11-16 PROCEDURE — C1769: CPT

## 2021-11-16 PROCEDURE — 99153 MOD SED SAME PHYS/QHP EA: CPT

## 2021-11-16 RX ORDER — ROSUVASTATIN CALCIUM 5 MG/1
1 TABLET ORAL
Qty: 0 | Refills: 0 | DISCHARGE

## 2021-11-16 RX ORDER — INSULIN GLARGINE 100 [IU]/ML
0 INJECTION, SOLUTION SUBCUTANEOUS
Qty: 0 | Refills: 0 | DISCHARGE

## 2021-11-16 RX ORDER — ACETAMINOPHEN 500 MG
650 TABLET ORAL ONCE
Refills: 0 | Status: DISCONTINUED | OUTPATIENT
Start: 2021-11-16 | End: 2021-11-30

## 2021-11-16 RX ORDER — METFORMIN HYDROCHLORIDE 850 MG/1
1 TABLET ORAL
Qty: 0 | Refills: 0 | DISCHARGE

## 2021-11-16 RX ORDER — SODIUM CHLORIDE 9 MG/ML
500 INJECTION INTRAMUSCULAR; INTRAVENOUS; SUBCUTANEOUS
Refills: 0 | Status: DISCONTINUED | OUTPATIENT
Start: 2021-11-16 | End: 2021-11-16

## 2021-11-16 RX ORDER — DEXTROSE 50 % IN WATER 50 %
25 SYRINGE (ML) INTRAVENOUS ONCE
Refills: 0 | Status: DISCONTINUED | OUTPATIENT
Start: 2021-11-16 | End: 2021-11-30

## 2021-11-16 RX ORDER — INSULIN LISPRO 100/ML
VIAL (ML) SUBCUTANEOUS ONCE
Refills: 0 | Status: DISCONTINUED | OUTPATIENT
Start: 2021-11-16 | End: 2021-11-30

## 2021-11-16 RX ORDER — DEXTROSE 50 % IN WATER 50 %
12.5 SYRINGE (ML) INTRAVENOUS ONCE
Refills: 0 | Status: DISCONTINUED | OUTPATIENT
Start: 2021-11-16 | End: 2021-11-30

## 2021-11-16 RX ORDER — INSULIN LISPRO 100/ML
0 VIAL (ML) SUBCUTANEOUS
Qty: 0 | Refills: 0 | DISCHARGE

## 2021-11-16 RX ORDER — ASPIRIN/CALCIUM CARB/MAGNESIUM 324 MG
325 TABLET ORAL ONCE
Refills: 0 | Status: COMPLETED | OUTPATIENT
Start: 2021-11-16 | End: 2021-11-16

## 2021-11-16 RX ORDER — SODIUM CHLORIDE 9 MG/ML
1000 INJECTION, SOLUTION INTRAVENOUS
Refills: 0 | Status: DISCONTINUED | OUTPATIENT
Start: 2021-11-16 | End: 2021-11-30

## 2021-11-16 RX ORDER — INSULIN GLARGINE 100 [IU]/ML
15 INJECTION, SOLUTION SUBCUTANEOUS
Qty: 0 | Refills: 0 | DISCHARGE

## 2021-11-16 RX ORDER — GLUCAGON INJECTION, SOLUTION 0.5 MG/.1ML
1 INJECTION, SOLUTION SUBCUTANEOUS ONCE
Refills: 0 | Status: DISCONTINUED | OUTPATIENT
Start: 2021-11-16 | End: 2021-11-30

## 2021-11-16 RX ORDER — CLOPIDOGREL BISULFATE 75 MG/1
1 TABLET, FILM COATED ORAL
Qty: 0 | Refills: 0 | DISCHARGE

## 2021-11-16 RX ORDER — CLOPIDOGREL BISULFATE 75 MG/1
300 TABLET, FILM COATED ORAL ONCE
Refills: 0 | Status: COMPLETED | OUTPATIENT
Start: 2021-11-16 | End: 2021-11-16

## 2021-11-16 RX ORDER — INSULIN LISPRO 100/ML
9 VIAL (ML) SUBCUTANEOUS
Qty: 0 | Refills: 0 | DISCHARGE

## 2021-11-16 RX ORDER — SODIUM CHLORIDE 9 MG/ML
500 INJECTION INTRAMUSCULAR; INTRAVENOUS; SUBCUTANEOUS
Refills: 0 | Status: DISCONTINUED | OUTPATIENT
Start: 2021-11-16 | End: 2021-11-30

## 2021-11-16 RX ORDER — FAMOTIDINE 10 MG/ML
1 INJECTION INTRAVENOUS
Qty: 0 | Refills: 0 | DISCHARGE

## 2021-11-16 RX ORDER — DEXTROSE 50 % IN WATER 50 %
15 SYRINGE (ML) INTRAVENOUS ONCE
Refills: 0 | Status: DISCONTINUED | OUTPATIENT
Start: 2021-11-16 | End: 2021-11-30

## 2021-11-16 RX ADMIN — SODIUM CHLORIDE 75 MILLILITER(S): 9 INJECTION INTRAMUSCULAR; INTRAVENOUS; SUBCUTANEOUS at 09:31

## 2021-11-16 RX ADMIN — Medication 325 MILLIGRAM(S): at 08:47

## 2021-11-16 RX ADMIN — CLOPIDOGREL BISULFATE 300 MILLIGRAM(S): 75 TABLET, FILM COATED ORAL at 08:47

## 2021-11-16 NOTE — PROGRESS NOTE ADULT - SUBJECTIVE AND OBJECTIVE BOX
Interventional Cardiology PA SDA Discharge Note    Patient without complaints. Ambulated and voided without difficulties    Afebrile, VSS    Ext:    		  	        Left              Radial :  no  hematoma, no    bleeding, dressing; C/D/I      Pulses:    intact LAD to baseline     A/P:  60 y/o Female w/ PMHx HTN, HLD, CAD w/ prior NSTEMI 3/13/19, s/p multiple PCIs (most recently 5/7/19 ENDER x2 prox/mid LAD and patent OM1 stent), and DM-II c/b retinopathy, initially presented to outpt cardiologist Dr. Snow c/o CP that is substernal, non-radiating, pressure-like, and occurs with exertion. In light of pts risk factors, CCS class III anginal symptoms and abnormal/suboptimal NST, pt now presents to [facility] for recommended cardiac catheterization with possible intervention if clinically indicated.    s/p cardiac catheterization 11/16/21: __     1.	Stable for discharge as per attending Dr. Snow after bed rest, pt voids, groin/wrist stable and 30 minutes of ambulation.  2.	Follow-up with PMD/Cardiologist Edy in 1-2 weeks  3.	Discharged forms signed and copies in chart    Interventional Cardiology PA SDA Discharge Note    Patient without complaints. Ambulated and voided without difficulties    Afebrile, VSS    Ext:    		  	        Left              Radial :  no  hematoma, no    bleeding, dressing; C/D/I      Pulses:    intact LAD to baseline     A/P:  60 y/o Female w/ PMHx HTN, HLD, CAD w/ prior NSTEMI 3/13/19, s/p multiple PCIs (most recently 5/7/19 ENDER x2 prox/mid LAD and patent OM1 stent), and DM-II c/b retinopathy, initially presented to outpt cardiologist Dr. Snow c/o CP that is substernal, non-radiating, pressure-like, and occurs with exertion. In light of pts risk factors, CCS class III anginal symptoms and abnormal/suboptimal NST, pt now presents to [facility] for recommended cardiac catheterization with possible intervention if clinically indicated.    s/p cardiac catheterization 11/16/21: diagnostic, LM normal, LAD patent stent, LCx patent stent and OM1 small, mRCA 40% unchanged, EDP 8, EF normal per echo. L TR @ 12p    1.	Stable for discharge as per attending Dr. Snow after bed rest, pt voids, groin/wrist stable and 30 minutes of ambulation.  2.	Follow-up with PMD/Cardiologist Edy in 1-2 weeks  3.	Discharged forms signed and copies in chart    Interventional Cardiology PA SDA Discharge Note    Patient without complaints. Ambulated and voided without difficulties    Afebrile, VSS    Ext:    		  	        Left              Radial :  yes  hematoma -- compressed x 15 minutes, coband placed, no    bleeding, dressing; C/D/I      Pulses:    intact LAD to baseline     A/P:  62 y/o Female w/ PMHx HTN, HLD, CAD w/ prior NSTEMI 3/13/19, s/p multiple PCIs (most recently 5/7/19 ENDER x2 prox/mid LAD and patent OM1 stent), and DM-II c/b retinopathy, initially presented to outpt cardiologist Dr. Snow c/o CP that is substernal, non-radiating, pressure-like, and occurs with exertion. In light of pts risk factors, CCS class III anginal symptoms and abnormal/suboptimal NST, pt now presents to [facility] for recommended cardiac catheterization with possible intervention if clinically indicated.    s/p cardiac catheterization 11/16/21: diagnostic, LM normal, LAD patent stent, LCx patent stent and OM1 small, mRCA 40% unchanged, EDP 8, EF normal per echo. L TR @ 12p    1.	Stable for discharge as per attending Dr. Snow after bed rest, pt voids, groin/wrist stable and 30 minutes of ambulation.  2.	Follow-up with PMD/Cardiologist Edy in 1-2 weeks  3.	Discharged forms signed and copies in chart

## 2021-11-22 DIAGNOSIS — I25.118 ATHEROSCLEROTIC HEART DISEASE OF NATIVE CORONARY ARTERY WITH OTHER FORMS OF ANGINA PECTORIS: ICD-10-CM

## 2021-11-22 DIAGNOSIS — Z95.5 PRESENCE OF CORONARY ANGIOPLASTY IMPLANT AND GRAFT: ICD-10-CM

## 2021-11-22 DIAGNOSIS — R94.39 ABNORMAL RESULT OF OTHER CARDIOVASCULAR FUNCTION STUDY: ICD-10-CM

## 2022-10-24 NOTE — H&P ADULT - NS ABD PE RECTAL EXAM
not examined
I have personally seen and examined the patient. I have collaborated with and supervised the
